# Patient Record
Sex: FEMALE | URBAN - METROPOLITAN AREA
[De-identification: names, ages, dates, MRNs, and addresses within clinical notes are randomized per-mention and may not be internally consistent; named-entity substitution may affect disease eponyms.]

---

## 2023-03-13 ENCOUNTER — APPOINTMENT (OUTPATIENT)
Dept: URBAN - METROPOLITAN AREA CLINIC 212 | Age: 18
Setting detail: DERMATOLOGY
End: 2023-03-14

## 2023-03-13 DIAGNOSIS — Z41.9 ENCOUNTER FOR PROCEDURE FOR PURPOSES OTHER THAN REMEDYING HEALTH STATE, UNSPECIFIED: ICD-10-CM

## 2023-03-13 PROCEDURE — OTHER COSMETIC CONSULTATION: FILLERS: OTHER

## 2023-03-13 PROCEDURE — OTHER COSMETIC DEPOSIT: OTHER

## 2023-03-13 PROCEDURE — OTHER MIPS QUALITY: OTHER

## 2023-03-13 ASSESSMENT — LOCATION SIMPLE DESCRIPTION DERM: LOCATION SIMPLE: LEFT LIP

## 2023-03-13 ASSESSMENT — LOCATION DETAILED DESCRIPTION DERM: LOCATION DETAILED: LEFT SUPERIOR VERMILION LIP

## 2023-03-13 ASSESSMENT — LOCATION ZONE DERM: LOCATION ZONE: LIP

## 2023-03-27 ENCOUNTER — APPOINTMENT (OUTPATIENT)
Dept: URBAN - METROPOLITAN AREA CLINIC 212 | Age: 18
Setting detail: DERMATOLOGY
End: 2023-03-28

## 2023-03-27 DIAGNOSIS — L90.8 OTHER ATROPHIC DISORDERS OF SKIN: ICD-10-CM

## 2023-03-27 PROCEDURE — OTHER COUNSELING: OTHER

## 2023-03-27 PROCEDURE — OTHER INVENTORY: OTHER

## 2023-03-27 PROCEDURE — OTHER FILLERS: OTHER

## 2023-03-27 PROCEDURE — OTHER MIPS QUALITY: OTHER

## 2023-03-27 ASSESSMENT — LOCATION ZONE DERM: LOCATION ZONE: LIP

## 2023-03-27 ASSESSMENT — LOCATION SIMPLE DESCRIPTION DERM: LOCATION SIMPLE: LEFT LIP

## 2023-03-27 ASSESSMENT — LOCATION DETAILED DESCRIPTION DERM: LOCATION DETAILED: LEFT SUPERIOR VERMILION LIP

## 2023-03-27 NOTE — HPI: COSMETIC (FILLERS)
Have You Had Fillers Before?: has not had fillers
Additional History: Interested in plumper look, opted for ultra over volbella

## 2024-01-04 ENCOUNTER — APPOINTMENT (OUTPATIENT)
Dept: URBAN - METROPOLITAN AREA CLINIC 212 | Age: 19
Setting detail: DERMATOLOGY
End: 2024-01-04

## 2024-01-04 DIAGNOSIS — Z41.9 ENCOUNTER FOR PROCEDURE FOR PURPOSES OTHER THAN REMEDYING HEALTH STATE, UNSPECIFIED: ICD-10-CM

## 2024-01-04 PROCEDURE — OTHER MIPS QUALITY: OTHER

## 2024-01-04 PROCEDURE — OTHER INVENTORY: OTHER

## 2024-01-04 PROCEDURE — OTHER FILLERS: OTHER

## 2024-01-04 NOTE — PROCEDURE: FILLERS
Jawline Filler Volume In Cc: 0
Map Statment: See Attach Map for Complete Details
Aspiration Statement: Aspiration was performed prior to injecting site with filler.
Additional Anesthesia Volume In Cc: 6
Include Cannula Information In Note?: No
Consent: Written consent obtained. We discussed the risks and benefits of soft tissue augmentation with hyaluronic acid including but not limited to swelling, bruising, allergic reaction, infection, incomplete augmentation, irregular contouring, procedural pain, superficial bleeding or foreign body reaction. We also reviewed rare but serious complications such as vascular occlusion which can result in necrosis or ulceration. Patient understands that treatment with fillers only augment areas on a temporary basis, usually for 12 months. We reviewed variability in patient response and that no guarantee of length of benefit can be made. Patient understands that the treatment is cosmetic in nature and not covered by insurance.
Filler: Juvederm Ultra XC
Topical Anesthesia?: 23% lidocaine, 7% tetracaine
Anesthesia Type: 1% lidocaine with epinephrine
Vermilion Lips Filler Volume In Cc: 0.6
Additional Area 1 Location: Perioral lines
Post-Care Instructions: After the procedure, patient instructed to apply ice to reduce swelling.
Use Map Statement For Sites (Optional): Yes
Detail Level: Zone

## 2025-01-13 ENCOUNTER — APPOINTMENT (OUTPATIENT)
Dept: URBAN - METROPOLITAN AREA CLINIC 212 | Age: 20
Setting detail: DERMATOLOGY
End: 2025-01-13

## 2025-01-13 DIAGNOSIS — Z41.9 ENCOUNTER FOR PROCEDURE FOR PURPOSES OTHER THAN REMEDYING HEALTH STATE, UNSPECIFIED: ICD-10-CM

## 2025-01-13 PROCEDURE — OTHER FILLERS: OTHER

## 2025-01-13 PROCEDURE — OTHER MIPS QUALITY: OTHER

## 2025-01-13 NOTE — PROCEDURE: FILLERS
Jawline Filler Volume In Cc: 0
Map Statment: See Attach Map for Complete Details
Aspiration Statement: Aspiration was performed prior to injecting site with filler.
Additional Anesthesia Volume In Cc: 6
Include Cannula Information In Note?: No
Consent: Written consent obtained. We discussed the risks and benefits of soft tissue augmentation with hyaluronic acid including but not limited to swelling, bruising, allergic reaction, infection, incomplete augmentation, irregular contouring, procedural pain, superficial bleeding or foreign body reaction. We also reviewed rare but serious complications such as vascular occlusion which can result in necrosis or ulceration. Patient understands that treatment with fillers only augment areas on a temporary basis, usually for 12 months. We reviewed variability in patient response and that no guarantee of length of benefit can be made. Patient understands that the treatment is cosmetic in nature and not covered by insurance.
Filler: Juvederm Ultra XC
Topical Anesthesia?: 23% lidocaine, 7% tetracaine
Anesthesia Type: 1% lidocaine with epinephrine
Additional Area 1 Location: Perioral lines
Post-Care Instructions: After the procedure, patient instructed to apply ice to reduce swelling.
Use Map Statement For Sites (Optional): Yes
Detail Level: Zone

## 2025-01-23 ENCOUNTER — APPOINTMENT (OUTPATIENT)
Dept: URBAN - METROPOLITAN AREA CLINIC 212 | Age: 20
Setting detail: DERMATOLOGY
End: 2025-01-23

## 2025-01-23 DIAGNOSIS — Z41.9 ENCOUNTER FOR PROCEDURE FOR PURPOSES OTHER THAN REMEDYING HEALTH STATE, UNSPECIFIED: ICD-10-CM

## 2025-01-23 PROCEDURE — OTHER MIPS QUALITY: OTHER

## 2025-01-23 PROCEDURE — OTHER INVENTORY: OTHER

## 2025-01-23 PROCEDURE — OTHER PHOTO-DOCUMENTATION: OTHER

## 2025-01-23 PROCEDURE — OTHER FILLERS: OTHER

## 2025-01-23 NOTE — PROCEDURE: FILLERS
Jawline Filler Volume In Cc: 0
Map Statment: See Attach Map for Complete Details
Aspiration Statement: Aspiration was performed prior to injecting site with filler.
Additional Anesthesia Volume In Cc: 6
Include Cannula Information In Note?: No
Consent: Written consent obtained. We discussed the risks and benefits of soft tissue augmentation with hyaluronic acid including but not limited to swelling, bruising, allergic reaction, infection, incomplete augmentation, irregular contouring, procedural pain, superficial bleeding or foreign body reaction. We also reviewed rare but serious complications such as vascular occlusion which can result in necrosis or ulceration. Patient understands that treatment with fillers only augment areas on a temporary basis, usually for 12 months. We reviewed variability in patient response and that no guarantee of length of benefit can be made. Patient understands that the treatment is cosmetic in nature and not covered by insurance.
Filler: Juvederm Volbella XC
Topical Anesthesia?: 23% lidocaine, 7% tetracaine
Anesthesia Type: 1% lidocaine with epinephrine
Vermilion Lips Filler Volume In Cc: 0.5
Additional Area 1 Location: Perioral lines
Post-Care Instructions: After the procedure, patient instructed to apply ice to reduce swelling.
Use Map Statement For Sites (Optional): Yes
Detail Level: Zone